# Patient Record
Sex: FEMALE | Race: WHITE | NOT HISPANIC OR LATINO | URBAN - METROPOLITAN AREA
[De-identification: names, ages, dates, MRNs, and addresses within clinical notes are randomized per-mention and may not be internally consistent; named-entity substitution may affect disease eponyms.]

---

## 2024-08-23 ENCOUNTER — TELEPHONE (OUTPATIENT)
Age: 39
End: 2024-08-23

## 2024-08-23 ENCOUNTER — OFFICE VISIT (OUTPATIENT)
Age: 39
End: 2024-08-23
Payer: COMMERCIAL

## 2024-08-23 VITALS
RESPIRATION RATE: 16 BRPM | HEART RATE: 65 BPM | DIASTOLIC BLOOD PRESSURE: 62 MMHG | HEIGHT: 62 IN | WEIGHT: 133.2 LBS | OXYGEN SATURATION: 98 % | SYSTOLIC BLOOD PRESSURE: 128 MMHG | TEMPERATURE: 98 F | BODY MASS INDEX: 24.51 KG/M2

## 2024-08-23 DIAGNOSIS — E55.9 VITAMIN D DEFICIENCY: ICD-10-CM

## 2024-08-23 DIAGNOSIS — Z86.79 HISTORY OF SUPRAVENTRICULAR TACHYCARDIA: ICD-10-CM

## 2024-08-23 DIAGNOSIS — Z00.00 ANNUAL PHYSICAL EXAM: Primary | ICD-10-CM

## 2024-08-23 DIAGNOSIS — Z13.6 ENCOUNTER FOR LIPID SCREENING FOR CARDIOVASCULAR DISEASE: ICD-10-CM

## 2024-08-23 DIAGNOSIS — N92.0 MENORRHAGIA WITH REGULAR CYCLE: ICD-10-CM

## 2024-08-23 DIAGNOSIS — R53.83 OTHER FATIGUE: ICD-10-CM

## 2024-08-23 DIAGNOSIS — Z12.4 SCREENING FOR CERVICAL CANCER: ICD-10-CM

## 2024-08-23 DIAGNOSIS — Z13.220 ENCOUNTER FOR LIPID SCREENING FOR CARDIOVASCULAR DISEASE: ICD-10-CM

## 2024-08-23 PROCEDURE — 99385 PREV VISIT NEW AGE 18-39: CPT

## 2024-08-23 RX ORDER — MULTIVITAMIN
1 CAPSULE ORAL DAILY
COMMUNITY

## 2024-08-23 NOTE — ASSESSMENT & PLAN NOTE
S/p cardiac ablation in 2009.  Stable since.  No current or recent palpitations or chest pains.  Heart rate normal at 65 today.  Will continue to monitor.

## 2024-08-23 NOTE — TELEPHONE ENCOUNTER
----- Message from Ivone Gutiérrez PA-C sent at 8/23/2024 12:36 PM EDT -----  There are several vaccinations on the patient's school form that I do not have record of on file.  Can you please request the patient send documentation of history of vaccination against varicella, polio, and meningitis.  I also need initial dose of MMR if she had it done. I see an MMR vaccine in 2023, but usually the patient has vaccination at 12 months and 4-6 years. These are the ones that I cannot find record of.

## 2024-08-23 NOTE — PROGRESS NOTES
Adult Annual Physical  Name: Giulia Silva      : 1985      MRN: [id number]  Encounter Provider: Ivone Gutiérrez PA-C  Encounter Date: 2024   Encounter department: [facility] [place]    Assessment & Plan   1. Annual physical exam  Patient provided me with forms for her school. I will complete the forms and call the patient once completed for pickup.   2. History of supraventricular tachycardia  Assessment & Plan:  S/p cardiac ablation in .  Stable since.  No current or recent palpitations or chest pains.  Heart rate normal at 65 today.  Will continue to monitor.  3. Vitamin D deficiency  -     Vitamin D 25 hydroxy; Future  History of vitamin D deficiency.  Plan to check labs to check current levels. Will treat accordingly based on results  4. Menorrhagia with regular cycle  -     CBC and differential; Future  Plan to check blood counts to ensure no anemia from heavy menses.   5. Other fatigue  -     CBC and differential; Future  -     TSH, 3rd generation with Free T4 reflex; Future  Plan to check thyroid and blood counts.   6. Encounter for lipid screening for cardiovascular disease  -     Lipid panel  7. Screening for cervical cancer  Patient had normal Pap smear last year through [facility]. Care gap request sent to close the care gap.     Immunizations and preventive care screenings were discussed with patient today. Appropriate education was printed on patient's after visit summary.    Counseling:  Alcohol/drug use: discussed moderation in alcohol intake, the recommendations for healthy alcohol use, and avoidance of illicit drug use.  Dental Health: discussed importance of regular tooth brushing, flossing, and dental visits.  Sexual health: discussed sexually transmitted diseases, partner selection, use of condoms, avoidance of unintended pregnancy, and contraceptive alternatives.  Exercise: the importance of regular exercise/physical activity was discussed. Recommend  exercise 3-5 times per week for at least 30 minutes.          History of Present Illness   {Disappearing Hyperlinks I Encounters * My Last Note * Since Last Visit * History :28742}  Adult Annual Physical:  Patient presents for annual physical. Giulia is a 38-year-old female with a past medical history of SVT s/p cardiac ablation in 2009 presenting to the office to establish care and for completion of paperwork for school.  She has no acute complaints or concerns today.  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  She works nights as an LPN.  She has a family history of a dad with hypertension and hypercholesterolemia, both of her grandmothers had heart attacks and 1 was a smoker, and her mom had a hysterectomy for heavy bleeding. Her periods are regular once monthly and last about 4-5 days. She complains of some heavy bleeding with her periods, but has never been anemic that she knows of. She also has a history of vitamin D deficiency 8-10 years ago and was on vitamin D supplementation at that time, but not currently.    .     Diet and Physical Activity:  - Diet/Nutrition: well balanced diet.  - Exercise: no formal exercise.    Depression Screening:  - PHQ-2 Score: 0    General Health:  - Sleep: sleeps well.  - Hearing: normal hearing bilateral ears.  - Vision: no vision problems and most recent eye exam > 1 year ago.  - Dental: regular dental visits and brushes teeth twice daily.    /GYN Health:  - Follows with GYN: yes.   - Menopause: premenopausal.   - Contraception:. none      Review of Systems   Constitutional:  Positive for fatigue. Negative for chills and fever.   HENT:  Negative for congestion, ear pain and sore throat.    Eyes:  Negative for pain and visual disturbance.   Respiratory:  Negative for cough and shortness of breath.    Cardiovascular:  Negative for chest pain and palpitations.   Gastrointestinal:  Negative for abdominal pain, constipation, diarrhea and vomiting.   Genitourinary:  Negative for  "dysuria and hematuria.   Musculoskeletal:  Negative for arthralgias, back pain and myalgias.   Skin:  Negative for color change and rash.   Neurological:  Negative for dizziness, seizures, syncope and headaches.   All other systems reviewed and are negative.        Objective   {Disappearing Hyperlinks   Review Vitals * Enter New Vitals * Results Review * Labs * Imaging * Cardiology * Procedures * Lung Cancer Screening :37865}  /62 (BP Location: Right arm, Patient Position: Sitting, Cuff Size: Standard)   Pulse 65   Temp 98 °F (36.7 °C) (Tympanic)   Resp 16   Ht 5' 2\" (1.575 m)   Wt 60.4 kg (133 lb 3.2 oz)   SpO2 98%   BMI 24.36 kg/m²     Physical Exam  Vitals and nursing note reviewed.   Constitutional:       General: She is not in acute distress.     Appearance: She is well-developed.   HENT:      Head: Normocephalic and atraumatic.      Right Ear: Tympanic membrane normal.      Left Ear: Tympanic membrane normal.      Nose: Nose normal.      Mouth/Throat:      Mouth: Mucous membranes are moist.      Pharynx: Oropharynx is clear. No oropharyngeal exudate.   Eyes:      Extraocular Movements: Extraocular movements intact.      Conjunctiva/sclera: Conjunctivae normal.   Cardiovascular:      Rate and Rhythm: Normal rate and regular rhythm.      Heart sounds: Normal heart sounds. No murmur heard.     No friction rub. No gallop.   Pulmonary:      Effort: Pulmonary effort is normal. No respiratory distress.      Breath sounds: Normal breath sounds. No wheezing, rhonchi or rales.   Abdominal:      Palpations: Abdomen is soft.      Tenderness: There is no abdominal tenderness.   Musculoskeletal:         General: No swelling.      Cervical back: Neck supple.   Skin:     General: Skin is warm and dry.      Capillary Refill: Capillary refill takes less than 2 seconds.   Neurological:      General: No focal deficit present.      Mental Status: She is alert.   Psychiatric:         Mood and Affect: Mood normal. "

## 2024-08-24 LAB
CHOLEST SERPL-MCNC: 187 MG/DL
HDLC SERPL-MCNC: 50 MG/DL
LDLC SERPL CALC-MCNC: 106 MG/DL (ref 0–100)
NONHDLC SERPL-MCNC: 137 MG/DL
TRIGL SERPL-MCNC: 153 MG/DL

## 2024-08-24 PROCEDURE — 80061 LIPID PANEL: CPT
